# Patient Record
Sex: MALE | Race: WHITE | NOT HISPANIC OR LATINO | Employment: FULL TIME | URBAN - METROPOLITAN AREA
[De-identification: names, ages, dates, MRNs, and addresses within clinical notes are randomized per-mention and may not be internally consistent; named-entity substitution may affect disease eponyms.]

---

## 2023-09-01 ENCOUNTER — OFFICE VISIT (OUTPATIENT)
Dept: URGENT CARE | Facility: CLINIC | Age: 48
End: 2023-09-01
Payer: COMMERCIAL

## 2023-09-01 VITALS
HEIGHT: 69 IN | BODY MASS INDEX: 23.7 KG/M2 | RESPIRATION RATE: 18 BRPM | OXYGEN SATURATION: 97 % | TEMPERATURE: 99 F | DIASTOLIC BLOOD PRESSURE: 77 MMHG | SYSTOLIC BLOOD PRESSURE: 132 MMHG | WEIGHT: 160 LBS | HEART RATE: 70 BPM

## 2023-09-01 DIAGNOSIS — B02.9 HERPES ZOSTER WITHOUT COMPLICATION: Primary | ICD-10-CM

## 2023-09-01 PROCEDURE — 99203 PR OFFICE/OUTPT VISIT, NEW, LEVL III, 30-44 MIN: ICD-10-PCS | Mod: S$GLB,,, | Performed by: PHYSICIAN ASSISTANT

## 2023-09-01 PROCEDURE — 99203 OFFICE O/P NEW LOW 30 MIN: CPT | Mod: S$GLB,,, | Performed by: PHYSICIAN ASSISTANT

## 2023-09-01 RX ORDER — VALACYCLOVIR HYDROCHLORIDE 1 G/1
1000 TABLET, FILM COATED ORAL 3 TIMES DAILY
Qty: 21 TABLET | Refills: 0 | Status: SHIPPED | OUTPATIENT
Start: 2023-09-01 | End: 2023-09-08

## 2023-09-01 NOTE — LETTER
September 1, 2023      [facility]  [address], [address]  [place] [address]  Phone: [phone]  Fax: [phone]       Patient: Beltran Melo   YOB: 1975  Date of Visit: 09/01/2023    To Whom It May Concern:    Luis Felipe Melo  was at [facility] on 09/01/2023. He may return to work/school on 9/5/2023 with no restrictions. If you have any questions or concerns, or if I can be of further assistance, please do not hesitate to contact me.    Sincerely,    Erma Carter PA-C

## 2023-09-01 NOTE — PATIENT INSTRUCTIONS
If not allergic,take tylenol (acetominophen) for fever control, chills, or body aches every 4 hours. Do not exceed 4000 mg/ day.If not allergic, take Motrin (Ibuprofen) every 4 hours for fever, chills, pain or inflammation. Do not exceed 2400 mg/day. You can alternate taking tylenol and motrin.     You must understand that you've received an Urgent Care treatment only and that you may be released before all your medical problems are known or treated. You, the patient, will arrange for follow up care as instructed.      Follow up with your PCP or specialty clinic as instructed in the next 2-3 days if not improved or as needed. You can call [phone] to schedule an appointment with appropriate provider.      If you condition worsens, we recommend that you receive another evaluation at the emergency room immediately or contact your primary medical clinic's after hours call service to discuss your concerns.      Please return here or go to the Emergency Department for any concerns or worsening condition.      If you were prescribed a narcotic or controlled substance, do not drive or operate heavy equipment or machinery while taking these medications.

## 2023-09-01 NOTE — PROGRESS NOTES
"Subjective:      Patient ID: Beltran Melo is a 47 y.o. male.    Vitals:  height is 5' 9" (1.753 m) and weight is 72.6 kg (160 lb). His oral temperature is 98.8 °F (37.1 °C). His blood pressure is 132/77 and his pulse is 70. His respiration is 18 and oxygen saturation is 97%.     Chief Complaint: Rash    Pt stated his rash started today.     Patient provider note starts here:  Patient presents to the clinic with complaints of a rash which he 1st noticed this morning.  Reports that he would had some pain to the left side of his chest and back for a few days prior but thought it was from sleeping on a sofa.  Reports that the rash itches.  Denies use of new soaps, lotions, detergents.  He is not put anything over the rash.  Denies a history of shingles.    Rash  This is a new problem. The current episode started 1 to 4 weeks ago. The problem has been gradually worsening since onset. The affected locations include the back and chest. The rash is characterized by itchiness, redness and swelling. It is unknown if there was an exposure to a precipitant. Pertinent negatives include no congestion, cough, diarrhea, eye pain, fatigue, fever, sore throat or vomiting. Past treatments include nothing. The treatment provided no relief. There is no history of allergies, asthma, eczema or varicella.       Constitution: Negative for chills, fatigue and fever.   HENT:  Negative for congestion and sore throat.    Neck: Negative for neck pain and neck stiffness.   Cardiovascular:  Negative for chest pain.   Eyes:  Negative for eye pain.   Respiratory:  Negative for chest tightness, cough and wheezing.    Gastrointestinal:  Negative for abdominal pain, vomiting and diarrhea.   Musculoskeletal:  Positive for pain.   Skin:  Positive for rash. Negative for wound and erythema.   Allergic/Immunologic: Positive for itching.   Neurological:  Negative for numbness and tingling.      Objective:     Physical Exam   Constitutional: He is oriented " to person, place, and time. He appears well-developed.  Non-toxic appearance. He does not appear ill. No distress.   HENT:   Head: Normocephalic and atraumatic. Head is without abrasion, without contusion and without laceration.   Ears:   Right Ear: External ear normal.   Left Ear: External ear normal.   Nose: Nose normal.   Mouth/Throat: Oropharynx is clear and moist and mucous membranes are normal.   Eyes: Conjunctivae, EOM and lids are normal. Pupils are equal, round, and reactive to light.   Neck: Trachea normal and phonation normal. Neck supple.   Cardiovascular: Normal rate, regular rhythm and normal heart sounds.   Pulmonary/Chest: Effort normal and breath sounds normal. No stridor. No respiratory distress. He has no wheezes.   Musculoskeletal: Normal range of motion.         General: Normal range of motion.   Neurological: He is alert and oriented to person, place, and time.   Skin: Skin is warm, dry, intact and rash (Patches of erythema noted to the left anterior chest as well as the left scapular region.  No definitive vesicles noted.  Erythematous patches resemble a dermatomal distribution.  Most consistent with shingles.). Capillary refill takes less than 2 seconds. No abrasion, No burn, No bruising, No erythema and No ecchymosis   Psychiatric: His speech is normal and behavior is normal. Judgment and thought content normal.   Nursing note and vitals reviewed.      Assessment:     1. Herpes zoster without complication        Plan:       Herpes zoster without complication  -     valACYclovir (VALTREX) 1000 MG tablet; Take 1 tablet (1,000 mg total) by mouth 3 (three) times daily. for 7 days  Dispense: 21 tablet; Refill: 0          Medical Decision Making:   History:   Old Medical Records: I decided to obtain old medical records.  Urgent Care Management:  A. Problem List:   -Acute: Herpes zoster   -Chronic: None  B. Differential diagnosis: herpes zoster, contact dermatitis  C. Diagnostic Testing Ordered:  None  D. Diagnostic Testing Considered: None  E. Independent Historians: Partner  F. Urgent Care Midlevel Independent Results Interpretation:   G. Radiology:  H. Review of Previous Medical Records: No previous medial records on file on chart review.   I. Home Medications Reviewed  J. Social Determinants of Health considered  K. Medical Decision Making and Disposition:  Patient presents to the clinic with complaints of a rash to the left side of the chest and back.  On exam, he is afebrile and nontoxic in appearance.  Exam findings are most consistent with herpes zoster.  Prescribed Valtrex and encouraged Tylenol/Motrin as needed for pain.  ED precautions discussed.  Encouraged close follow-up with primary care.  He verbalized understanding and agreed with this plan.         Patient Instructions   If not allergic,take tylenol (acetominophen) for fever control, chills, or body aches every 4 hours. Do not exceed 4000 mg/ day.If not allergic, take Motrin (Ibuprofen) every 4 hours for fever, chills, pain or inflammation. Do not exceed 2400 mg/day. You can alternate taking tylenol and motrin.     You must understand that you've received an Urgent Care treatment only and that you may be released before all your medical problems are known or treated. You, the patient, will arrange for follow up care as instructed.      Follow up with your PCP or specialty clinic as instructed in the next 2-3 days if not improved or as needed. You can call [phone] to schedule an appointment with appropriate provider.      If you condition worsens, we recommend that you receive another evaluation at the emergency room immediately or contact your primary medical clinic's after hours call service to discuss your concerns.      Please return here or go to the Emergency Department for any concerns or worsening condition.      If you were prescribed a narcotic or controlled substance, do not drive or operate heavy equipment or machinery  while taking these medications.